# Patient Record
Sex: MALE | Race: WHITE
[De-identification: names, ages, dates, MRNs, and addresses within clinical notes are randomized per-mention and may not be internally consistent; named-entity substitution may affect disease eponyms.]

---

## 2017-03-15 ENCOUNTER — HOSPITAL ENCOUNTER (OUTPATIENT)
Dept: HOSPITAL 47 - RADNMMAIN | Age: 57
Discharge: HOME | End: 2017-03-15
Payer: MEDICARE

## 2017-03-15 DIAGNOSIS — R07.9: Primary | ICD-10-CM

## 2017-03-15 PROCEDURE — 78452 HT MUSCLE IMAGE SPECT MULT: CPT

## 2017-03-15 PROCEDURE — 93017 CV STRESS TEST TRACING ONLY: CPT

## 2017-03-15 NOTE — NM
EXAMINATION TYPE: NM stress cardiolite complete

 

DATE OF EXAM: 3/15/2017 10:53 AM

 

COMPARISON: 10/12/2011

 

HISTORY: Chest pain

 

 

TECHNIQUE:  After the intravenous administration of 10.2 mCi Tc 99m Sestamibi - Rest images obtained 
64 minutes post injection.  The patient exercised using a  ASHELY protocol and 1 minute prior to peak 
exercise was injected with 27.5 mCi Tc 99m Sestamibi - Stress images obtained 10 minutes post injecti
on.

 

FINDINGS: 

 

Targeted heart rate was achieved during performance of the study. Review of stress and rest SPECT alejandro
ges demonstrates no distinct perfusion abnormality.  Gated analysis shows normal wall motion with an 
estimated left ventricular ejection fraction of 71 %.

 

 

 

IMPRESSION:  

 

No scintigraphic evidence for reversible ischemia

## 2017-03-15 NOTE — EST
DATE OF SERVICE:  03/15/2017



AGE:   56Y        SEX:  M        HT:    6'0"       WT:  265 lbs.       

     

Protocol Dionisio: X  Other:  Stress Cardiolite  Stage:  2  Dur. of 

Exercise: 8:21 



*Heart Rate         Blood Pressure                               

*Rest:  97          Rest:  154/59                                

*                              

*Max. Achieved:     145  Maximum BP:  214/102     

85% PMHR:  138

100% PMHR:  162          



*METS:  10.0   





INDICATIONS:  Chest pain. 



MEDICATIONS:   Prevacid, vitamins.



Baseline EKG shows sinus rhythm, PVCs and nonspecific ST-T wave 

changes. Patient exercised on Dionisio protocol for a total of 8-1/2 

minutes achieving 10 METs, 89% of predicted maximal heart rate without 

chest pain or diagnostic ST-segment depression.  



CONCLUSION: 

1. Good exercise tolerance. 

2. Inconclusive EKG part of the stress test due to baseline EKG 

abnormalities. 

3. Cardiolite portion of the stress test will be reported separately.

## 2019-02-15 ENCOUNTER — HOSPITAL ENCOUNTER (OUTPATIENT)
Dept: HOSPITAL 47 - RADXRMAIN | Age: 59
End: 2019-02-15
Attending: ORTHOPAEDIC SURGERY
Payer: COMMERCIAL

## 2019-02-15 DIAGNOSIS — Z01.818: Primary | ICD-10-CM

## 2019-02-15 DIAGNOSIS — M48.02: ICD-10-CM

## 2019-02-15 PROCEDURE — 71046 X-RAY EXAM CHEST 2 VIEWS: CPT

## 2019-02-15 NOTE — XR
EXAMINATION TYPE: XR chest 2V

 

DATE OF EXAM: 2/15/2019

 

COMPARISON: NONE

 

HISTORY: Preoperative testing for cervical surgery. No known heart for lung conditions.

 

TECHNIQUE:  Frontal and lateral views of the chest are obtained.

 

FINDINGS:  There is no focal air space opacity, pleural effusion, or pneumothorax seen.  The cardiac 
silhouette size is within normal limits.   The osseous structures are intact. Minimal multilevel dege
nerative changes of the thoracic spine are noted.

 

IMPRESSION:  No acute cardiopulmonary process.

## 2019-02-20 ENCOUNTER — HOSPITAL ENCOUNTER (OUTPATIENT)
Dept: HOSPITAL 47 - OR | Age: 59
LOS: 1 days | Discharge: HOME | End: 2019-02-21
Attending: ORTHOPAEDIC SURGERY
Payer: COMMERCIAL

## 2019-02-20 VITALS — BODY MASS INDEX: 30.2 KG/M2

## 2019-02-20 VITALS — TEMPERATURE: 98.4 F

## 2019-02-20 VITALS — RESPIRATION RATE: 16 BRPM

## 2019-02-20 DIAGNOSIS — H91.90: ICD-10-CM

## 2019-02-20 DIAGNOSIS — Z87.891: ICD-10-CM

## 2019-02-20 DIAGNOSIS — M25.78: ICD-10-CM

## 2019-02-20 DIAGNOSIS — H40.9: ICD-10-CM

## 2019-02-20 DIAGNOSIS — M40.202: ICD-10-CM

## 2019-02-20 DIAGNOSIS — M48.02: Primary | ICD-10-CM

## 2019-02-20 DIAGNOSIS — Z79.899: ICD-10-CM

## 2019-02-20 DIAGNOSIS — G89.29: ICD-10-CM

## 2019-02-20 DIAGNOSIS — M50.11: ICD-10-CM

## 2019-02-20 PROCEDURE — 22551 ARTHRD ANT NTRBDY CERVICAL: CPT

## 2019-02-20 PROCEDURE — 72020 X-RAY EXAM OF SPINE 1 VIEW: CPT

## 2019-02-20 PROCEDURE — 22552 ARTHRD ANT NTRBD CERVICAL EA: CPT

## 2019-02-20 PROCEDURE — 20931 SP BONE ALGRFT STRUCT ADD-ON: CPT

## 2019-02-20 RX ADMIN — CEFAZOLIN SCH: 330 INJECTION, POWDER, FOR SOLUTION INTRAMUSCULAR; INTRAVENOUS at 13:49

## 2019-02-20 RX ADMIN — POTASSIUM CHLORIDE SCH MLS: 14.9 INJECTION, SOLUTION INTRAVENOUS at 06:57

## 2019-02-20 RX ADMIN — HYDROCODONE BITARTRATE AND ACETAMINOPHEN PRN EACH: 7.5; 325 TABLET ORAL at 12:17

## 2019-02-20 RX ADMIN — CYCLOBENZAPRINE HYDROCHLORIDE PRN MG: 10 TABLET, FILM COATED ORAL at 20:33

## 2019-02-20 RX ADMIN — CEFAZOLIN SCH GM: 10 INJECTION, POWDER, FOR SOLUTION INTRAVENOUS at 20:37

## 2019-02-20 RX ADMIN — CEFAZOLIN SCH GM: 10 INJECTION, POWDER, FOR SOLUTION INTRAVENOUS at 23:07

## 2019-02-20 RX ADMIN — HYDROCODONE BITARTRATE AND ACETAMINOPHEN PRN EACH: 7.5; 325 TABLET ORAL at 20:33

## 2019-02-20 NOTE — P.OP
Date of Procedure: 02/20/19


Preoperative Diagnosis: 


Severe cervical stenosis C3 4 C4 5 C5 6, left upper extremity radiculopathy, 

left upper extremity weakness, severe disc degeneration C3 4 C4 5 C5 6, 

cervical kyphosis, neck pain,


Postoperative Diagnosis: 


Same


Anesthesia: GETA


Pathology: none sent


Condition: stable


Disposition: PACU


Description of Procedure: 


BRIEF OPERATIVE NOTE





Preoperative Diagnosis:Severe cervical stenosis C3 4 C4 5 C5 6, left upper 

extremity radiculopathy, left upper extremity weakness, severe disc 

degeneration C3 4 C4 5 C5 6, cervical kyphosis, neck pain


Postoperative Diagnosis: Same


Procedure: Anterior cervical decompression with discectomy and fusion C3 4 C4 5 

C5 6


      Removal of large anterior cervical osteophytes C3 4 C4 5 C5 6


                  Placement of interbody graft C3 4 C4 5 C5 6


                  Application of anterior cervical plate C3 4 5 6


Surgeon: Dr. Herbert


Assistant: Aj BERNARDO who is present throughout the entire the case 

persistence during positioning, dissection, exposure, visualization, and all 

crucial elements of the case as well as closure.


Anesthesia: General anesthesia per Dr. Blanco


Estimated blood loss: Approximately 100 mL


Complications: None apparent


Components implanted: K2M Daisy anterior cervical plate system with screws and 

Vikos interbody allograft bone graft with 1 mL of DBX bone putty supplement the 

graft


Disposition: To recovery room in good stable condition.





OPERATIVE INDICATIONS





The patient has had long-standing issues in their neck and upper extremities.  

He is having significant issues of his left upper extremity with numbness 

tingling and evidence weakness at his left upper extremity.  He had severe 

cervical stenosis with findings of severe disc degeneration large osteophytes 

and cord compression with foraminal stenosis.  These findings correlate well 

with his neck and upper extremity symptoms.  The patient has been through 

conservative treatment.  He is not having any prolonged benefit despite 

aggressive conservative treatment and he was having some worsening of his left 

upper extremity radicular with his weakness.  We discussed various treatment 

options including surgery, and the patient wishes to proceed with surgery We 

discussed the risk, patient's alternatives and benefits of surgery including 

but not limited to, risk of bleeding risk of infection, risk of need for 

further surgery, risk of decreased, loss of motion, muscle function, malunion 

nonunion, hardware failure, nerve damage, paralysis, heart attack, and death.





OPERATIVE SUMMARY





After discussing all the risks, patient alternatives and benefits at length, 

the patient elected to proceed with surgical intervention, signed informed 

consent, and presented for their procedure.  The patient was seen and examined 

in the preoperative holding area and the surgical site was marked.  The patient 

was given antibiotics and brought to the operating room.





The patient was positioned on the operating room table in a supine position 

being careful to pad any bony prominences and pressure points.  The patient was 

sedated and intubated by anesthesia in standard fashion.  Once the airway and C-

spine were stabilized the patient's arms were padded and tucked at her side, 

with her shoulders gently taped.  The head was placed in a donut pad with the 

neck in good neutral alignment and position.  We were careful to maintain the 

patient's cervical spine and good neutral alignment and position throughout.





The patient was prepped and draped in a normal standard fashion.  An 

appropriate timeout and keystone protocol performed.  We were able to proceed 

with the surgery.  The local wound area was infiltrated with local anesthetic. 





An incision was made transversely approximately 2-1/2 cm over the appropriate 

levels at the level of C4 5.  Dissection was taken down subcutaneously to the 

level of the platysma which was split in line with its fibers.  Dissection was 

somewhat difficult in him as he had significantly matted down tissue and it 

took some extra time for dissection.  Dissection was taken with a carotid 

approach, with the trachea and esophagus medial and the carotid sheath 

laterally.  We dissected down to the anterior surface of the vertebral bodies 

from C3 to see 6.  Intraoperative x-ray was taken which showed a marker at the 

appropriate level at C4 5.  With the appropriate level positively confirmed, we 

were able to proceed with discectomy at the appropriate levels.  There were 

very large anterior cervical osteophytes particularly at C4 5 and at C5 6.  

Extra time was taken to perform remove the large osteophytes get down to the 

anterior surface the vertebral bodies.  All of the operative levels were 

exposed appropriately from C3 to C6. The patient had all their twitches back, 

and there was no evidence of recurrent laryngeal issue.  The wound was 

copiously irrigated and suctioned dry as had been done periodically throughout 

the case.  At the appropriate level/levels, starting at C5 6 and then moving C4 

5 and then C3 4, I established an annulotomy with an 11 blade scalpel.  A 

discectomy was performed with a combination of pituitary rongeurs, curettes, a 

high-speed bur, and Kerrison rongeurs.  There is large posterior disc 

protrusion and herniation and posterior osteophytes causing further stenosis 

these were taken down during the process of the decompression and discectomy.  

The posterior longitudinal ligament was taken down as were any posterior 

osteophytes.  This gave good central and bilateral foraminal decompression.  

There is no evidence of any dural tear or leak.  The endplates were prepared 

with a high-speed bur.  With the endplates in good parallel position, I was 

able to size for the appropriate size interbody graft.  The wound was irrigated 

and suctioned dry the graft was prepared and malleted into position.  It had 

good alignment and position with the anterior surface flush with the anterior 

surface of the vertebral bodies.  This was done similarly the appropriate 

levels first at C5 6 and then at C4 5 and C3 4.





With the grafts intact, I was able to measure and contour and appropriate sized 

plate.  The plate was positioned at the midline over the appropriate levels 

from C3 to C6.  Screw holes were established with a hand drill and drill guide.

  Screws were placed in good alignment and position with excellent bony 

purchase.  They were seated under the locking device.  The construct was 

checked and found to be stable.  Intraoperative x-ray was taken which showed 

good alignment and position of the implants at the appropriate levels.  There 

was no evidence of any dural tear or leak.  Good hemostasis was maintained.  

The wound was copiously irrigated and suctioned dry as had been done  

periodically throughout the case.





The platysma was closed with absorbable suture.  The subcutaneous tissue was 

closed.  The subcuticular tissue was closed with absorbable suture.  The wound 

was cleaned and dried and dressed appropriately.





A soft cervical collar was placed appropriately.  The patient was woken up by 

anesthesia, extubated, transferred back gently to their hospital bed and 

brought to the recovery room in good stable condition.





The patient will be admitted to the hospital for appropriate postoperative care

, medical management and monitoring.  We will continue to follow them closely 

about the postoperative course.

## 2019-02-20 NOTE — XR
EXAMINATION TYPE: XR cervical spine 1V

 

DATE OF EXAM: 2/20/2019

 

COMPARISON: Earlier exam

 

HISTORY: Cervical fusion

 

TECHNIQUE: Lateral cervical spine

 

FINDINGS: There is been an anterior cervical fusion extending between C3 and C6. Disc spacers have be
en utilized.

 

IMPRESSION:

1.  Status post anterior cervical fusion C3-C6

## 2019-02-20 NOTE — XR
EXAMINATION TYPE: XR cervical spine 1V

 

DATE OF EXAM: 2/20/2019

 

COMPARISON: None

 

HISTORY: Needle placement spinal fusion

 

TECHNIQUE: Lateral cervical spine

 

FINDINGS: 

Suspected C5 level. Degenerative disc changes throughout the cervical spine especially noted C5 C5-6.


 

IMPRESSION:

1.  Intraoperative exam with the needle directed to the C5 vertebral body

## 2019-02-21 VITALS — HEART RATE: 108 BPM | SYSTOLIC BLOOD PRESSURE: 131 MMHG | DIASTOLIC BLOOD PRESSURE: 65 MMHG

## 2019-02-21 RX ADMIN — CEFAZOLIN SCH: 330 INJECTION, POWDER, FOR SOLUTION INTRAMUSCULAR; INTRAVENOUS at 01:13

## 2019-02-21 RX ADMIN — HYDROCODONE BITARTRATE AND ACETAMINOPHEN PRN EACH: 7.5; 325 TABLET ORAL at 08:05

## 2019-02-21 RX ADMIN — CYCLOBENZAPRINE HYDROCHLORIDE PRN MG: 10 TABLET, FILM COATED ORAL at 04:15

## 2019-02-21 RX ADMIN — POTASSIUM CHLORIDE SCH: 14.9 INJECTION, SOLUTION INTRAVENOUS at 06:57

## 2019-02-21 RX ADMIN — HYDROCODONE BITARTRATE AND ACETAMINOPHEN PRN EACH: 7.5; 325 TABLET ORAL at 04:15

## 2019-02-21 NOTE — P.DS
Providers


Date of admission: 





2/20/19





Attending physician: 


SHAWN Herbert





Primary care physician: 


[facility] Course: 





The patient presented on the day of admission as per their operative note.  He 

had severe cervical stenosis with left upper extremity weakness and some early 

myelopathy with cervical kyphosis and degenerative disc disease.  He is making 

great progress and feels that he is doing much better today.  He has some 

soreness in the back shoulders as expected.  His tolerating his diet adequately.





Physical Exam





The incision site is clean dry and intact.  There is no erythema no drainage.  

There is no purulence no evidence of infection.  His neck is soft and supple.  

There is no active drainage.





Abdomen soft and nontender.





Chest has good excursion with deep inspiration and expiration.





The patient has active and passive range of motion intact at the upper and 

lower extremities.  There is no acute change in neurologic status.  He has good 

motion in his bilateral upper extremities





Hospital Course





Postoperative day #1 status post anterior cervical discectomy decompression and 

fusion C3 4 C4 5 C5 6 for his severe cervical stenosis with degenerative disc 

disease and upper extremity radiculopathy and weakness.  


The patient has been making good progress postoperatively.  He is very happy 

with the way his arm feels thus far and his improvement in his nerve sensation 

is left upper extremity.  They have completed the prophylactic antibiotics 

without any signs or symptoms of infection.  His neck is soft and supple.  The 

patient has been able to advance their diet, and is tolerating diet adequately.

  The pain was initially controlled with IV medications and is now controlled 

appropriately with oral medications.  The patient has been able to increase 

their mobilization.


The patient has progressed appropriately.  I think they are in good stable 

condition for discharge today.  They will be sent home with appropriate 

prescriptions.  I answered their questions to the best of my ability in a 

language that they can understand and they are agreeable with the plan.  They 

will follow up as directed in approximately 2 weeks or sooner is having 

problems.


Patient Condition at Discharge: Good





Plan - Discharge Summary


Discharge Rx Participant: Yes


New Discharge Prescriptions: 


New


   HYDROcodone/APAP 7.5-325MG [Norco 7.5-325] 1 tab PO Q4H PRN 3 Days #18 tab


     PRN Reason: Pain





No Action


   HYDROcodone/APAP 7.5-325MG [Norco 7.5-325] 1 tab PO TID PRN


     PRN Reason: Pain


   Naproxen Sodium [Aleve] 440 mg PO BID PRN


     PRN Reason: Pain


   Cyclobenzaprine [Flexeril] 10 mg PO TID PRN


     PRN Reason: Pain


   Multivit-Min/FA/Lycopen/Lutein [Centrum Silver Tablet] 1 each PO DAILY


Discharge Medication List





Cyclobenzaprine [Flexeril] 10 mg PO TID PRN 02/13/19 [History]


HYDROcodone/APAP 7.5-325MG [Norco 7.5-325] 1 tab PO TID PRN 02/13/19 [History]


Multivit-Min/FA/Lycopen/Lutein [Centrum Silver Tablet] 1 each PO DAILY 02/13/19 

[History]


Naproxen Sodium [Aleve] 440 mg PO BID PRN 02/13/19 [History]


HYDROcodone/APAP 7.5-325MG [Norco 7.5-325] 1 tab PO Q4H PRN 3 Days #18 tab 02/20 /19 [Rx]








Follow up Appointment(s)/Referral(s): 


SHAWN Herbert DO [Doctor of Osteopathic Medicine] - 2 Weeks


Activity/Diet/Wound Care/Special Instructions: 


Avoid heavy or rigorous activity.





May ambulate as tolerated.





No repetitive bending twisting or lifting.





No lifting greater than 20 pounds.





No overhead work.





Keep site clean.





May shower with waterproof Tegaderm intact.





On Monday, the patient may shower with area uncovered, believe Steri-Strips 

intact and allow them to fray off on their own.  Do not soak in tub.


Discharge Disposition: HOME SELF-CARE

## 2019-07-25 ENCOUNTER — HOSPITAL ENCOUNTER (EMERGENCY)
Dept: HOSPITAL 47 - EC | Age: 59
Discharge: HOME | End: 2019-07-25
Payer: COMMERCIAL

## 2019-07-25 VITALS
RESPIRATION RATE: 16 BRPM | HEART RATE: 91 BPM | DIASTOLIC BLOOD PRESSURE: 104 MMHG | TEMPERATURE: 98.5 F | SYSTOLIC BLOOD PRESSURE: 189 MMHG

## 2019-07-25 DIAGNOSIS — Z98.1: ICD-10-CM

## 2019-07-25 DIAGNOSIS — M54.16: Primary | ICD-10-CM

## 2019-07-25 DIAGNOSIS — Z96.698: ICD-10-CM

## 2019-07-25 DIAGNOSIS — M19.90: ICD-10-CM

## 2019-07-25 DIAGNOSIS — Z96.643: ICD-10-CM

## 2019-07-25 DIAGNOSIS — H91.90: ICD-10-CM

## 2019-07-25 DIAGNOSIS — Z87.891: ICD-10-CM

## 2019-07-25 PROCEDURE — 74176 CT ABD & PELVIS W/O CONTRAST: CPT

## 2019-07-25 PROCEDURE — 96374 THER/PROPH/DIAG INJ IV PUSH: CPT

## 2019-07-25 PROCEDURE — 96375 TX/PRO/DX INJ NEW DRUG ADDON: CPT

## 2019-07-25 PROCEDURE — 96361 HYDRATE IV INFUSION ADD-ON: CPT

## 2019-07-25 PROCEDURE — 99285 EMERGENCY DEPT VISIT HI MDM: CPT

## 2019-07-25 NOTE — CT
EXAMINATION TYPE: CT abdomen pelvis wo con

 

DATE OF EXAM: 7/25/2019

 

HISTORY: Abdominal and low back pain

 

CT DLP: 925.8 mGycm.  Automated Exposure Control for Dose Reduction was Utilized.

 

TECHNIQUE:  CT scan of the abdomen and pelvis is performed without oral or IV contrast.

 

COMPARISON: NONE

 

FINDINGS:  Within the limitations of a non-contrast study, the following observations are made.

 

LUNG BASES: Dependent atelectasis in both bases.

 

LIVER/GB: Liver is diffusely low dense consistent with fatty infiltration. There is dependent 9 mm ca
lculus in gallbladder axial image 48.

 

PANCREAS: No significant abnormality is seen.

 

SPLEEN: No significant abnormality is seen.

 

ADRENALS: No significant abnormality is seen.

 

KIDNEYS: There is 1.3 cm low dense lesion upper pole right kidney on axial image 52 favoring simple c
yst laterally. No renal stones or hydronephrosis is seen bilaterally.

 

BOWEL: Cecum is low-lying into the right pelvis. No suspicious small or large bowel dilatation. Michelle
l appearing appendix from cecum. A few scattered diverticula in the left and sigmoid colon.

 

GENITAL ORGANS: Suboptimal evaluation due to hip surgical changes.

 

LYMPH NODES: No greater than 1cm abdominal or pelvic lymph nodes are appreciated.

 

OSSEOUS STRUCTURES: Metallic hardware from bilateral hip arthroplasty causes streak artifact limiting
 evaluation of pelvic structures. Moderate to severe multilevel spurring in the thoracolumbar spine. 
Moderate multilevel disc space narrowing with multilevel vacuum disc phenomena. Transitional type sydni
tebra lumbosacral junction noted.

 

OTHER: No significant additional abnormality is seen.

 

IMPRESSION: No suspicious acute finding identified.

## 2019-07-25 NOTE — ED
Back Pain HPI





- General


Chief Complaint: Back Pain/Injury


Stated Complaint: Back pain


Time Seen by Provider: 07/25/19 13:02


Source: EMS, RN notes reviewed, old records reviewed


Limitations: physical limitation





- History of Present Illness


Initial Comments: 





This is a 59-year-old male the ER for evaluation.  Patient resents today for 

evaluation regarding back pain, patient has severe back pain with history of 

back pain back pain just like prior history of MRI.  No new trauma does have 

pain going down left leg with no neurological symptoms no weakness or loss of 

sensation.  No loss of bowel or bladder.  No fevers.  Patient is had cervical 

spine fusion sees Dr. Nuno for orthopedics scheduled to have more procedures on

lower back which is worse pain is currently


MD Complaint: back pain


-: days(s)


Similar Symptoms Previously: Yes


Place: home


Radiation: none


Severity: severe


Severity scale (1-10): 10


Quality: sharp, dull, stabbing


Consistency: constant


Improves With: immobilization


Worsens With: movement


Context: unknown (History of back injury)


Associated Symptoms: denies other symptoms





- Related Data


                                Home Medications











 Medication  Instructions  Recorded  Confirmed


 


Cyclobenzaprine [Flexeril] 10 mg PO TID PRN 02/13/19 07/25/19


 


HYDROcodone/APAP 7.5-325MG [Norco 1 tab PO TID PRN 02/13/19 07/25/19





7.5-325]   


 


Multivit-Min/FA/Lycopen/Lutein 1 tab PO DAILY 02/13/19 07/25/19





[Centrum Silver Tablet]   











                                    Allergies











Allergy/AdvReac Type Severity Reaction Status Date / Time


 


No Known Allergies Allergy   Verified 07/25/19 13:27














Review of Systems


ROS Statement: 


Those systems with pertinent positive or pertinent negative responses have been 

documented in the HPI.





ROS Other: All systems not noted in ROS Statement are negative.





Past Medical History


Past Medical History: Osteoarthritis (OA)


Additional Past Medical History / Comment(s): NECK PAIN & LEFT ARM PAIN., HX OF 

BACK PAIN., TINNITUS, [place]. \


History of Any Multi-Drug Resistant Organisms: None Reported


Past Surgical History: Joint Replacement, Orthopedic Surgery


Additional Past Surgical History / Comment(s): ARLEY TOTAL HIPS (2011 & 2012), 

LEFT WRIST WITH PINS (2015), ANTERIOR CERVICAL REPAIR


Past Anesthesia/Blood Transfusion Reactions: No Reported Reaction


Past Psychological History: No Psychological Hx Reported


Smoking Status: Former smoker


Past Alcohol Use History: Occasional


Past Drug Use History: None Reported





- Past Family History


  ** Mother


Family Medical History: No Reported History





General Exam


Limitations: physical limitation


General appearance: alert, in no apparent distress


Head exam: Present: atraumatic, normocephalic, normal inspection


Eye exam: Present: normal appearance, PERRL, EOMI.  Absent: scleral icterus, 

conjunctival injection, periorbital swelling


ENT exam: Present: normal exam, mucous membranes moist


Neck exam: Present: normal inspection.  Absent: tenderness, meningismus, 

lymphadenopathy


Respiratory exam: Present: normal lung sounds bilaterally.  Absent: respiratory 

distress, wheezes, rales, rhonchi, stridor


Cardiovascular Exam: Present: regular rate, normal rhythm, normal heart sounds. 

Absent: systolic murmur, diastolic murmur, rubs, gallop, clicks


GI/Abdominal exam: Present: soft, normal bowel sounds.  Absent: distended, 

tenderness, guarding, rebound, rigid


Extremities exam: Present: normal inspection, full ROM, normal capillary refill.

 Absent: tenderness, pedal edema, joint swelling, calf tenderness


Back exam: Present: normal inspection


Neurological exam: Present: alert, oriented X3, CN II-XII intact


Psychiatric exam: Present: normal affect, normal mood


Skin exam: Present: warm, dry, intact, normal color.  Absent: rash





Course


                                   Vital Signs











  07/25/19





  13:00


 


Temperature 98.5 F


 


Pulse Rate 91


 


Respiratory 16





Rate 


 


Blood Pressure 189/104


 


O2 Sat by Pulse 97





Oximetry 














- Reevaluation(s)


Reevaluation #1: 





07/25/19 13:38


Medical record is reviewed


Reevaluation #2: 





07/25/19 13:38


Pains improved


Reevaluation #3: 





07/25/19 14:53


Patient is able to actively with no significant difficulty





Medical Decision Making





- Medical Decision Making





59 male the ER for evaluation he does have current back pain improved.  Able to 

ambulate no neurological findings.  Patient can be discharged home





- Radiology Data


Radiology results: report reviewed (CT head and pelvis was focused on 

lumbosacral spine is negative for acute disease), image reviewed





Disposition


Clinical Impression: 


 Mechanical back pain, Sciatica, Lumbar radiculopathy





Disposition: HOME SELF-CARE


Condition: Good


Instructions (If sedation given, give patient instructions):  Acute Low Back 

Pain (ED)


Is patient prescribed a controlled substance at d/c from ED?: No


Referrals: 


Zahida Cantrell DO [Primary Care Provider] - 1-2 days